# Patient Record
Sex: FEMALE
[De-identification: names, ages, dates, MRNs, and addresses within clinical notes are randomized per-mention and may not be internally consistent; named-entity substitution may affect disease eponyms.]

---

## 2019-06-25 ENCOUNTER — APPOINTMENT (OUTPATIENT)
Dept: VASCULAR SURGERY | Facility: CLINIC | Age: 64
End: 2019-06-25
Payer: COMMERCIAL

## 2019-06-25 VITALS
BODY MASS INDEX: 29.86 KG/M2 | SYSTOLIC BLOOD PRESSURE: 153 MMHG | WEIGHT: 197 LBS | TEMPERATURE: 98.3 F | HEIGHT: 68 IN | HEART RATE: 58 BPM | DIASTOLIC BLOOD PRESSURE: 91 MMHG | OXYGEN SATURATION: 95 %

## 2019-06-25 DIAGNOSIS — I83.813 VARICOSE VEINS OF BILATERAL LOWER EXTREMITIES WITH PAIN: ICD-10-CM

## 2019-06-25 DIAGNOSIS — I83.10 VARICOSE VEINS OF UNSPECIFIED LOWER EXTREMITY WITH INFLAMMATION: ICD-10-CM

## 2019-06-25 DIAGNOSIS — I83.893 VARICOSE VEINS OF BILATERAL LOWER EXTREMITIES WITH OTHER COMPLICATIONS: ICD-10-CM

## 2019-06-25 PROBLEM — Z00.00 ENCOUNTER FOR PREVENTIVE HEALTH EXAMINATION: Status: ACTIVE | Noted: 2019-06-25

## 2019-06-25 PROCEDURE — 93970 EXTREMITY STUDY: CPT

## 2019-06-25 PROCEDURE — 99204 OFFICE O/P NEW MOD 45 MIN: CPT

## 2019-06-25 RX ORDER — CHROMIUM 200 MCG
TABLET ORAL
Refills: 0 | Status: ACTIVE | COMMUNITY

## 2019-06-25 NOTE — DATA REVIEWED
[FreeTextEntry1] : DELANO Fitzpatrick performed b/l vle - b/l gsv closed c/w prior evlt, no dvt no svt no truncal reflux both legs -

## 2019-06-25 NOTE — PHYSICAL EXAM
[JVD] : no jugular venous distention  [2+] : right 2+ [Ankle Swelling (On Exam)] : not present [Ankle Swelling Bilaterally] : bilaterally  [Varicose Veins Of Lower Extremities] : bilaterally [] : present [Ankle Swelling On The Left] : moderate [Purpura] : no purpura  [No Rash or Lesion] : No rash or lesion [Petechiae] : no petechiae [Skin Ulcer] : no ulcer [Skin Induration] : no induration [Oriented to Person] : oriented to person [Oriented to Place] : oriented to place [Alert] : alert [Oriented to Time] : oriented to time [Calm] : calm [de-identified] : marilynl [de-identified] : wnl [de-identified] : wdwn nad [FreeTextEntry1] : b/l diffuse small superficial branch veins, b/l medial ankle chronic  venous dermatitis, no large ropey branch varicose veins [de-identified] : as above [de-identified] : wnl

## 2019-06-25 NOTE — ASSESSMENT
[Arterial/Venous Disease] : arterial/venous disease [Other: _____] : [unfilled] [FreeTextEntry1] : Injection sclerotherapy bilateral lower extremities \par \par Doctor’s office procedure\par \par Ms. LOLIS CHAVEZ has been seen by me for vascular consultation and evaluation of painful small branch varicose veins.  This patient has attempted conservative measures with support hose and leg elevation and has had no satisfactory results.  The patient has intact pulses with no evidence of arterial disease.  Patient is complaining of pain, swelling, itching, stabbing, burning, tenderness and cramping which is interfering with daily duties and activities.  \par \par Reviewed with patient that sclerotherapy is the injection of a sterile agent (polidocanol) into the small branch varicose veins.  It works by damaging the inner wall of the vein.  Eventually, the vein collapses on itself and over time, the damaged vein is replaced with fibrous connective tissue and prevents blood flow through the vessel. Sclerotherapy does not prevent the development of new veins.  Before the treatment, photos may be obtained.   \par \par Dr. Bergeron explained the risks and benefits of sclerotherapy.  Patient should not receive polidocanol if they have a known clotting disorder, have a known allergy to polidocanol, are pregnant or nursing.  Individual results may vary, and the patient may require multiple injection sessions for optimal treatment success.  Each session consists of total dose of 4 mL, 0.5% sclerosing agent (polidocanol) injected, using a very fine needle (30 gauge), into the veins of the treated leg.  Slight stinging may be experienced with each injection.  The skin may look irritated after the treatment and/or bruising may be noted at injection sites.  Irritation and bruising will resolve with time.   One leg is treated at a time and if the patient needs to return for multiple sessions, the patient may return every 2 weeks for additional sessions.  Patient instructed to wear compression stocking continuously for 72 hours following the treatment and may go about their normal activities after the session.  \par \par

## 2019-06-25 NOTE — PROCEDURE
[FreeTextEntry1] : pt to return for b/l sclerotherapy - Rx given for thigh high support hose 20-30 mmHg

## 2019-06-25 NOTE — REASON FOR VISIT
[Initial Evaluation] : an initial evaluation [FreeTextEntry1] : She was referred by her podiatrist for evaluation of 'phlebitis Bilateral lower extremity'.  Patient with reports of Left medial ankle discomfort x 1 month.  She wears knee high compression stockings with slight relief of discomfort.  She has history of Right GSV EVLT ~ 8 years ago in [facility], left gsv evlt several years ago, and Bilateral leg sclerotherapy ~ 2 years ago at [facility].  VLE today confirms no DVT, no SVT, Bilateral GSV not visualized consistent with prior laser ablation procedure and no venous reflux Bilaterally.   She has Bilateral lower extremity venous skin changes, Left > Right, and no open wounds.  Recommend thigh high medical grade 20-30 mmHg compression stockings to be worn daily and during flights.  Provided patient with prescription.  She has superficial branch varicose veins Bilateral leg and would benefit from Bilateral leg sclerotherapy.   She will schedule sclerotherapy visit.

## 2019-06-25 NOTE — ADDENDUM
[FreeTextEntry1] : CEAP Classification:\par []     C0----No visible or palpable signs of venous disease\par []     C1----Telangiectasia or reticular veins\par []     C2----Varicose veins; distinguished from reticular veins by a diameter of 3mm or more.\par []     C3----Edema\par x    F0a--Qtzodcz in skin and subcutaneous tissues secondary to CVD---Pigmentation or eczema\par []     X6a--Qoqiehr in skin and subcutaneous tissues secondary to CVD---Lipodermatosclerosis or atrophic iza\par []     C5----Healed venous ulcer\par []     C6----Active venous ulcer\par x     S ----Symptoms including ache, pain, tightness, skin irritation, heaviness, muscle cramps, and other venous dysfunction\par []     A ----Asymptomatic\par CEAP Sclore  = C4a, S\par \par \par Attribute                            Absent=0                    Mild=1                                   Moderate=2                                           Severe=3\par 2         Pain                          None                          Occasional                           Daily, moderate                                      Daily, severe\par          \par 1        Varicose Veins         None                          Few: branch VV                  Multiple: GS VV                                      Extensive: GS & LS   \par \par 0        Venous Edema         None                         Evening ankle only                Afternoon above ankle                          Morning above ankle\par \par  2       Skin Pigmentation      None or low             Diffuse, limited, old brown    Diffuse, lower 1/3, recent pigment        Above lower 1/3, and recent pigment\par \par  0       Inflammation              None                        Mild cellulitis                           Moderate cellulitis, lower 1/3                 Severe cellulitis, lower 1/3 & above\par \par 0        Induration                  None                        Focal (<5 cm)                        Medial or lateral, < lower 1/3                  Entire lower 1/3   \par \par 0        No.of active ulcers   0                              1                                             2                                                             >2\par \par 0        Active ulceration      None                       < 3 months                             > 3 months, < 1 year                              Not healed > 1 year\par \par 0        Active ulcer, size     None                       < 2 cm diameter                     2-6 cm diameter                                      > 6 cm diameter      \par \par  2       Compressive tx        No use/compliant    Intermittent use                      Wears most days                                   Full compliance\par \par \par  7     TOTAL (max 30 pts) Venous Clinical Severity Score\par

## 2019-08-28 ENCOUNTER — APPOINTMENT (OUTPATIENT)
Dept: VASCULAR SURGERY | Facility: CLINIC | Age: 64
End: 2019-08-28
Payer: COMMERCIAL

## 2019-08-28 VITALS
SYSTOLIC BLOOD PRESSURE: 134 MMHG | TEMPERATURE: 98.5 F | HEART RATE: 67 BPM | OXYGEN SATURATION: 94 % | DIASTOLIC BLOOD PRESSURE: 89 MMHG

## 2019-08-28 DIAGNOSIS — L30.9 DERMATITIS, UNSPECIFIED: ICD-10-CM

## 2019-08-28 PROCEDURE — 36471 NJX SCLRSNT MLT INCMPTNT VN: CPT | Mod: LT

## 2019-08-28 RX ADMIN — POLIDOCANOL 4 %: 0.01 INJECTION, SOLUTION INTRAVENOUS at 00:00

## 2019-08-28 NOTE — REASON FOR VISIT
[Procedure: _________] : a [unfilled] procedure visit [FreeTextEntry1] : Patient with small painful and cramping, tender superficial branch varicose veins.  She is here for sclerotherapy session, left medial and lateral  ankle.  Ms. LOLIS CHAVEZ will follow up in 2 weeks for next sclerotherapy session.

## 2019-08-28 NOTE — PROCEDURE
[FreeTextEntry1] : Left lower extremity sclerotherapy. [FreeTextEntry2] : Venous insufficiency.  Diffuse, painful  left lower extremity small branch varicose veins with limb pain, swelling, itching, burning, tenderness and cramping. [FreeTextEntry3] : Ms. LOLIS CHAVEZ is a 64 year year old F  with  left  lower extremity symptomatic small branch varicose veins.   The patient presented to the office for localized sclerotherapy injections.  After explaining risks and benefits, informed consent was obtained.  All questions answered.\par \par The patient was brought into the examination room and placed supine on procedure table.  Left  leg for sclerotherapy treatment is cleaned with 70% isopropyl alcohol.  Sclerosant was mixed using 2 mL of 0.9% Sodium Chloride Injection and 2 mL of 1% polidocanol (Asclera) using filtered needle into sterile 5 mL syringe; 4 mL of 0.5% Asclera injected.  Filtered needle removed from syringe and sterile 30 G ½ inch needle is attached to syringe with sclerosant.  Using a 30 gauge needle, the sclerosant was directly injected into the symptomatic, superficial small branch varicose veins.   Injections were performed on left  lower extremity and the veins were mostly localized to the left medial ankle.  At completion, treated area was wiped with dry gauze and thigh high 20-30 mmHg compression stockings placed, to be worn x 72 hours.  Patient tolerated the procedure well with no complications.\par \par

## 2019-08-28 NOTE — ADDENDUM
[FreeTextEntry1] : RN reviewed with the patient post procedure, sclerotherapy, instructions:  Patient instructed to wear compression stocking continuously for 3 days (72 hours) following the procedure and may take the stocking off for daily shower.  Walk for 15-20 minutes immediately following the procedure and daily for the next few days.  Avoid heavy exercise, sunbathing, hot baths/sauna, extended periods of sitting or standing, such as long plane flights for 2-3 days after treatment. Do not be alarmed if you see bruises, brown spots, black streaks, and lumps.  These are part of the healing process, and most will resolve with time.  It can take several weeks to see final results and additional treatments may be required.  Registered nurse advised patient to call the office should they have any questions or concerns following sclerotherapy treatment.    \par \par

## 2019-09-03 RX ORDER — POLIDOCANOL 0.01 G/ML
1 INJECTION, SOLUTION INTRAVENOUS
Qty: 0 | Refills: 0 | Status: COMPLETED | OUTPATIENT
Start: 2019-08-28

## 2019-09-12 ENCOUNTER — APPOINTMENT (OUTPATIENT)
Dept: VASCULAR SURGERY | Facility: CLINIC | Age: 64
End: 2019-09-12
Payer: COMMERCIAL

## 2019-09-12 VITALS
SYSTOLIC BLOOD PRESSURE: 131 MMHG | TEMPERATURE: 98.1 F | OXYGEN SATURATION: 95 % | DIASTOLIC BLOOD PRESSURE: 85 MMHG | HEART RATE: 77 BPM

## 2019-09-12 PROCEDURE — 36471 NJX SCLRSNT MLT INCMPTNT VN: CPT | Mod: LT

## 2019-09-12 RX ORDER — POLIDOCANOL 0.01 G/ML
1 INJECTION, SOLUTION INTRAVENOUS
Qty: 0 | Refills: 0 | Status: COMPLETED | OUTPATIENT
Start: 2019-09-12

## 2019-09-12 RX ADMIN — POLIDOCANOL 4 %: 0.01 INJECTION, SOLUTION INTRAVENOUS at 00:00

## 2019-09-12 NOTE — PROCEDURE
[FreeTextEntry1] : Left  lower extremity sclerotherapy. [FreeTextEntry2] : Venous insufficiency.  Diffuse, painful left lower extremity small branch varicose veins with limb pain, swelling, itching, burning, tenderness and cramping.\par  [FreeTextEntry3] : Ms. LOLIS CHAVEZ is a 64 year year old F  with left  lower extremity symptomatic small branch varicose veins.   The patient presented to the office for localized sclerotherapy injections.  After explaining risks and benefits, informed consent was obtained.  All questions answered.\par \par The patient was brought into the examination room and placed supine on procedure table.  Left leg for sclerotherapy treatment is cleaned with 70% isopropyl alcohol.  Sclerosant was mixed using 2 mL of 0.9% Sodium Chloride Injection and 2 mL of 1% polidocanol (Asclera) using filtered needle into sterile 5 mL syringe; 4 mL of 0.5% Asclera injected.  Filtered needle removed from syringe and sterile 30 G ½ inch needle is attached to syringe with sclerosant.  Using a 30 gauge needle, the sclerosant was directly injected into the symptomatic, superficial small branch varicose veins.   Injections were performed on left lower extremity and the veins were mostly localized to the left medial ankle.  At completion, treated area was wiped with dry gauze and thigh high 20-30 mmHg compression stockings placed, to be worn x 72 hours.  Patient tolerated the procedure well with no complications.\par \par

## 2019-09-12 NOTE — REASON FOR VISIT
[Procedure: _________] : a [unfilled] procedure visit [FreeTextEntry1] : Patient with small painful and cramping, tender superficial branch varicose veins.  She is here for sclerotherapy session, left medial ankle and lateral calf.  Ms. LOLIS CHAVEZ will follow up in 2 weeks for next sclerotherapy session.

## 2019-10-10 ENCOUNTER — APPOINTMENT (OUTPATIENT)
Dept: VASCULAR SURGERY | Facility: CLINIC | Age: 64
End: 2019-10-10
Payer: COMMERCIAL

## 2019-10-10 VITALS
TEMPERATURE: 98.7 F | SYSTOLIC BLOOD PRESSURE: 119 MMHG | DIASTOLIC BLOOD PRESSURE: 80 MMHG | HEART RATE: 68 BPM | OXYGEN SATURATION: 95 %

## 2019-10-10 PROCEDURE — 36471 NJX SCLRSNT MLT INCMPTNT VN: CPT | Mod: RT

## 2019-10-10 RX ORDER — POLIDOCANOL 0.01 G/ML
1 INJECTION, SOLUTION INTRAVENOUS
Qty: 0 | Refills: 0 | Status: COMPLETED | OUTPATIENT
Start: 2019-10-10

## 2019-10-10 RX ADMIN — POLIDOCANOL 4 %: 0.01 INJECTION, SOLUTION INTRAVENOUS at 00:00

## 2019-10-10 NOTE — REASON FOR VISIT
[Procedure: _________] : a [unfilled] procedure visit [FreeTextEntry1] : Patient with small painful and cramping, tender superficial branch varicose veins.  She is here for sclerotherapy session, right medial foot and ankle.  Ms. LOLIS CHAVEZ will follow up in 2 weeks for next sclerotherapy session.

## 2019-10-10 NOTE — PROCEDURE
[FreeTextEntry1] : Right lower extremity sclerotherapy. [FreeTextEntry2] : \par Venous insufficiency.  Diffuse, painful right  lower extremity small branch varicose veins with limb pain, swelling, itching, burning, tenderness and cramping. [FreeTextEntry3] : Ms. LOLIS CHAVEZ is a 64 year year old F  with right  lower extremity symptomatic small branch varicose veins.   The patient presented to the office for localized sclerotherapy injections.  After explaining risks and benefits, informed consent was obtained.  All questions answered.\par \par The patient was brought into the examination room and placed supine on procedure table.  Right   leg for sclerotherapy treatment is cleaned with 70% isopropyl alcohol.  Sclerosant was mixed using 2 mL of 0.9% Sodium Chloride Injection and 2 mL of 1% polidocanol (Asclera) using filtered needle into sterile 5 mL syringe; 4 mL of 0.5% Asclera injected.  Filtered needle removed from syringe and sterile 30 G ½ inch needle is attached to syringe with sclerosant.  Using a 30 gauge needle, the sclerosant was directly injected into the symptomatic, superficial small branch varicose veins.   Injections were performed on   right  lower extremity and the veins were mostly localized to the right medial foot and ankle.  At completion, treated area was wiped with dry gauze and thigh high 20-30 mmHg compression stockings placed, to be worn x 72 hours.  Patient tolerated the procedure well with no complications.\par \par

## 2019-10-24 ENCOUNTER — APPOINTMENT (OUTPATIENT)
Dept: VASCULAR SURGERY | Facility: CLINIC | Age: 64
End: 2019-10-24
Payer: COMMERCIAL

## 2019-10-24 VITALS
OXYGEN SATURATION: 95 % | SYSTOLIC BLOOD PRESSURE: 131 MMHG | TEMPERATURE: 96.7 F | DIASTOLIC BLOOD PRESSURE: 87 MMHG | HEART RATE: 58 BPM

## 2019-10-24 PROCEDURE — 36471 NJX SCLRSNT MLT INCMPTNT VN: CPT | Mod: RT

## 2019-10-24 RX ADMIN — POLIDOCANOL 4 %: 0.01 INJECTION, SOLUTION INTRAVENOUS at 00:00

## 2019-10-24 NOTE — REASON FOR VISIT
[Procedure: _________] : a [unfilled] procedure visit [FreeTextEntry1] : Patient with small painful and cramping, tender superficial branch varicose veins.  She is here for sclerotherapy session,right medial foot, ankle and calf.  Ms. LOLIS CHAVEZ will follow up in 2 weeks for next sclerotherapy session.

## 2019-10-24 NOTE — PROCEDURE
[FreeTextEntry1] : Right lower extremity sclerotherapy.\par  [FreeTextEntry3] : Ms. LOLIS CHAVEZ is a 64 year year old F  with  right lower extremity symptomatic small branch varicose veins.   The patient presented to the office for localized sclerotherapy injections.  After explaining risks and benefits, informed consent was obtained.  All questions answered.\par \par The patient was brought into the examination room and placed supine on procedure table.  Right  leg for sclerotherapy treatment is cleaned with 70% isopropyl alcohol.  Sclerosant was mixed using 2 mL of 0.9% Sodium Chloride Injection and 2 mL of 1% polidocanol (Asclera) using filtered needle into sterile 5 mL syringe; 4 mL of 0.5% Asclera injected.  Filtered needle removed from syringe and sterile 30 G ½ inch needle is attached to syringe with sclerosant.  Using a 30 gauge needle, the sclerosant was directly injected into the symptomatic, superficial small branch varicose veins.   Injections were performed on right lower extremity and the veins were mostly localized to the right medial foot, ankle and calf.  At completion, treated area was wiped with dry gauze and thigh high 20-30 mmHg compression stockings placed, to be worn x 72 hours.  Patient tolerated the procedure well with no complications.\par \par  [FreeTextEntry2] : Venous insufficiency.  Diffuse, painful right lower extremity small branch varicose veins with limb pain, swelling, itching, burning, tenderness and cramping.

## 2019-10-25 RX ORDER — POLIDOCANOL 0.01 G/ML
1 INJECTION, SOLUTION INTRAVENOUS
Qty: 0 | Refills: 0 | Status: COMPLETED | OUTPATIENT
Start: 2019-10-24

## 2019-11-07 ENCOUNTER — APPOINTMENT (OUTPATIENT)
Dept: VASCULAR SURGERY | Facility: CLINIC | Age: 64
End: 2019-11-07
Payer: COMMERCIAL

## 2019-11-07 VITALS
HEART RATE: 63 BPM | OXYGEN SATURATION: 96 % | SYSTOLIC BLOOD PRESSURE: 122 MMHG | DIASTOLIC BLOOD PRESSURE: 84 MMHG | TEMPERATURE: 97.6 F

## 2019-11-07 DIAGNOSIS — I83.12 VARICOSE VEINS OF LEFT LOWER EXTREMITY WITH INFLAMMATION: ICD-10-CM

## 2019-11-07 DIAGNOSIS — R25.2 CRAMP AND SPASM: ICD-10-CM

## 2019-11-07 DIAGNOSIS — I83.892 VARICOSE VEINS OF LEFT LOWER EXTREMITY WITH OTHER COMPLICATIONS: ICD-10-CM

## 2019-11-07 DIAGNOSIS — I83.812 VARICOSE VEINS OF LEFT LOWER EXTREMITY WITH PAIN: ICD-10-CM

## 2019-11-07 DIAGNOSIS — M79.605 PAIN IN LEFT LEG: ICD-10-CM

## 2019-11-07 PROCEDURE — 36471 NJX SCLRSNT MLT INCMPTNT VN: CPT

## 2019-11-07 RX ADMIN — POLIDOCANOL 4 %: 0.01 INJECTION, SOLUTION INTRAVENOUS at 00:00

## 2019-11-07 NOTE — REASON FOR VISIT
[Procedure: _________] : a [unfilled] procedure visit [FreeTextEntry1] : Patient with small painful and cramping, tender superficial branch varicose veins.  She is here for sclerotherapy session, left medial and lateral ankle and foot.  MsErin LOLIS CHAVEZ will follow up in 2 weeks for next sclerotherapy session.

## 2019-11-07 NOTE — ADDENDUM
[FreeTextEntry1] : RN reviewed with the patient post procedure, sclerotherapy, instructions:  Patient instructed to wear compression stocking continuously for 3 days (72 hours) following the procedure and may take the stocking off for daily shower.  Walk for 15-20 minutes immediately following the procedure and daily for the next few days.  Avoid heavy exercise, sunbathing, hot baths/sauna, extended periods of sitting or standing, such as long plane flights for 2-3 days after treatment. Do not be alarmed if you see bruises, brown spots, black streaks, and lumps.  These are part of the healing process, and most will resolve with time.  It can take several weeks to see final results and additional treatments may be required.  Registered nurse advised patient to call the office should they have any questions or concerns following sclerotherapy treatment.    \par \par \par

## 2019-11-07 NOTE — PROCEDURE
[FreeTextEntry1] : Left lower extremity sclerotherapy. [FreeTextEntry2] : \par Venous insufficiency.  Diffuse, painful   left  lower extremity small branch varicose veins with limb pain, swelling, itching, burning, tenderness and cramping. [FreeTextEntry3] : Ms. LOLIS CHAVEZ is a 64 year year old F  with left   lower extremity symptomatic small branch varicose veins.   The patient presented to the office for localized sclerotherapy injections.  After explaining risks and benefits, informed consent was obtained.  All questions answered.\par \par The patient was brought into the examination room and placed supine on procedure table.  Left  leg for sclerotherapy treatment is cleaned with 70% isopropyl alcohol.  Sclerosant was mixed using 2 mL of 0.9% Sodium Chloride Injection and 2 mL of 1% polidocanol (Asclera) using filtered needle into sterile 5 mL syringe; 4 mL of 0.5% Asclera injected.  Filtered needle removed from syringe and sterile 30 G ½ inch needle is attached to syringe with sclerosant.  Using a 30 gauge needle, the sclerosant was directly injected into the symptomatic, superficial small branch varicose veins.   Injections were performed on left lower extremity and the veins were mostly localized to the left medial and lateral ankle and foot.  At completion, treated area was wiped with dry gauze and thigh high 20-30 mmHg compression stockings placed, to be worn x 72 hours.  Patient tolerated the procedure well with no complications.\par \par

## 2019-11-11 RX ORDER — POLIDOCANOL 0.01 G/ML
1 INJECTION, SOLUTION INTRAVENOUS
Qty: 0 | Refills: 0 | Status: COMPLETED | OUTPATIENT
Start: 2019-11-07

## 2019-12-04 ENCOUNTER — APPOINTMENT (OUTPATIENT)
Dept: VASCULAR SURGERY | Facility: CLINIC | Age: 64
End: 2019-12-04
Payer: COMMERCIAL

## 2019-12-04 VITALS
SYSTOLIC BLOOD PRESSURE: 132 MMHG | OXYGEN SATURATION: 94 % | TEMPERATURE: 98.5 F | DIASTOLIC BLOOD PRESSURE: 87 MMHG | HEART RATE: 54 BPM

## 2019-12-04 DIAGNOSIS — R25.2 CRAMP AND SPASM: ICD-10-CM

## 2019-12-04 DIAGNOSIS — I83.891 VARICOSE VEINS OF RIGHT LOWER EXTREMITY WITH OTHER COMPLICATIONS: ICD-10-CM

## 2019-12-04 DIAGNOSIS — I83.11 VARICOSE VEINS OF RIGHT LOWER EXTREMITY WITH INFLAMMATION: ICD-10-CM

## 2019-12-04 DIAGNOSIS — I83.811 VARICOSE VEINS OF RIGHT LOWER EXTREMITY WITH PAIN: ICD-10-CM

## 2019-12-04 PROCEDURE — 36471 NJX SCLRSNT MLT INCMPTNT VN: CPT | Mod: RT

## 2019-12-04 RX ORDER — POLIDOCANOL 0.01 G/ML
1 INJECTION, SOLUTION INTRAVENOUS
Qty: 0 | Refills: 0 | Status: COMPLETED | OUTPATIENT
Start: 2019-12-04

## 2019-12-04 RX ADMIN — POLIDOCANOL 4 %: 0.01 INJECTION, SOLUTION INTRAVENOUS at 00:00

## 2019-12-04 NOTE — PROCEDURE
[FreeTextEntry1] : Right lower extremity sclerotherapy. [FreeTextEntry2] : \par Venous insufficiency.  Diffuse, painful right lower extremity small branch varicose veins with limb pain, swelling, itching, burning, tenderness and cramping. [FreeTextEntry3] : Ms. LOLIS CHAVEZ is a 64 year year old F  with right  lower extremity symptomatic small branch varicose veins.   The patient presented to the office for localized sclerotherapy injections.  After explaining risks and benefits, informed consent was obtained.  All questions answered.\par \par The patient was brought into the examination room and placed supine on procedure table.  Right leg for sclerotherapy treatment is cleaned with 70% isopropyl alcohol.  Sclerosant was mixed using 2 mL of 0.9% Sodium Chloride Injection and 2 mL of 1% polidocanol (Asclera) using filtered needle into sterile 5 mL syringe; 4 mL of 0.5% Asclera injected.  Filtered needle removed from syringe and sterile 30 G ½ inch needle is attached to syringe with sclerosant.  Using a 30 gauge needle, the sclerosant was directly injected into the symptomatic, superficial small branch varicose veins.   Injections were performed on right  lower extremity and the veins were mostly localized to the  right medial foot, ankle and calf.  At completion, treated area was wiped with dry gauze and thigh high 20-30 mmHg compression stockings placed, to be worn x 72 hours.  Patient tolerated the procedure well with no complications.\par \par

## 2019-12-04 NOTE — REASON FOR VISIT
[Procedure: _________] : a [unfilled] procedure visit [FreeTextEntry1] : Patient with small painful and cramping, tender superficial branch varicose veins.  She is here for sclerotherapy session, right medial foot, ankle and calf.  Ms. LOLIS CHAVEZ will follow up in 2 weeks for next sclerotherapy session.